# Patient Record
Sex: FEMALE | Race: WHITE | URBAN - METROPOLITAN AREA
[De-identification: names, ages, dates, MRNs, and addresses within clinical notes are randomized per-mention and may not be internally consistent; named-entity substitution may affect disease eponyms.]

---
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
## 2021-01-11 ENCOUNTER — TELEPHONE ENCOUNTER (OUTPATIENT)
Dept: URBAN - METROPOLITAN AREA CLINIC 92 | Facility: CLINIC | Age: 21
End: 2021-01-11

## 2021-01-14 ENCOUNTER — TELEPHONE ENCOUNTER (OUTPATIENT)
Dept: URBAN - METROPOLITAN AREA CLINIC 92 | Facility: CLINIC | Age: 21
End: 2021-01-14

## 2021-01-14 ENCOUNTER — OFFICE VISIT (OUTPATIENT)
Dept: URBAN - METROPOLITAN AREA TELEHEALTH 2 | Facility: TELEHEALTH | Age: 21
End: 2021-01-14
Payer: COMMERCIAL

## 2021-01-14 DIAGNOSIS — R14.0 BLOATING: ICD-10-CM

## 2021-01-14 DIAGNOSIS — K59.01 CONSTIPATION: ICD-10-CM

## 2021-01-14 DIAGNOSIS — R10.12 LUQ ABDOMINAL PAIN: ICD-10-CM

## 2021-01-14 DIAGNOSIS — R11.0 NAUSEA: ICD-10-CM

## 2021-01-14 DIAGNOSIS — D50.8 ANEMIA, DUE TO INADEQUATE IRON INTAKE: ICD-10-CM

## 2021-01-14 DIAGNOSIS — D50.9 IRON DEFICIENCY ANEMIA: ICD-10-CM

## 2021-01-14 PROCEDURE — G8420 CALC BMI NORM PARAMETERS: HCPCS | Performed by: INTERNAL MEDICINE

## 2021-01-14 PROCEDURE — G9903 PT SCRN TBCO ID AS NON USER: HCPCS | Performed by: INTERNAL MEDICINE

## 2021-01-14 PROCEDURE — 99214 OFFICE O/P EST MOD 30 MIN: CPT | Performed by: INTERNAL MEDICINE

## 2021-01-14 PROCEDURE — 1036F TOBACCO NON-USER: CPT | Performed by: INTERNAL MEDICINE

## 2021-01-14 PROCEDURE — G8427 DOCREV CUR MEDS BY ELIG CLIN: HCPCS | Performed by: INTERNAL MEDICINE

## 2021-01-14 PROCEDURE — G8482 FLU IMMUNIZE ORDER/ADMIN: HCPCS | Performed by: INTERNAL MEDICINE

## 2021-01-14 RX ORDER — SODIUM, POTASSIUM,MAG SULFATES 17.5-3.13G
354 ML SOLUTION, RECONSTITUTED, ORAL ORAL
Qty: 354 ML | Refills: 0 | OUTPATIENT
Start: 2021-01-14

## 2021-01-14 RX ORDER — LINACLOTIDE 145 UG/1
1 CAPSULE AT LEAST 30 MINUTES BEFORE THE FIRST MEAL OF THE DAY ON AN EMPTY STOMACH CAPSULE, GELATIN COATED ORAL ONCE A DAY
Qty: 90 | Refills: 3 | Status: ACTIVE | COMMUNITY
Start: 2020-12-30

## 2021-01-14 NOTE — HPI-OTHER HISTORIES
This is a 20-year-old female seen in Dec for GI consult.       She has chronic fatigue associated with night sweats and arthralgia.  She has a hx of reflux and frequent eructation associated with LUQ abdominal pain, bloating, and nausea. The reflux however has improved since cutting out sugar but the nausea which has been present for years has been more intense and constant for months. No vomiting and no agg factors. Zofran and phenergan do not help. She has tried a gluten-free diet without any significant improvement of symptoms.    The patient has chronic constipation with infrequent bowel movement without any overt bleeding including hematemesis, melena, hematochezia.  There is no fever, chills, and denies any unintentional weight loss. She has severe bloating that worsens with po intake. + brain fog.  She tried linzess 145 since last OV without sign improvement--BM 3/week and small and hard  She had a CT of the abdomen and pelvis that demonstrated enlarged spleen and a positive Monospot. She has also been falsely diagnosed twice in the past with mono when presenting w similar sx.  She had labs on 09/12/2020 that showed iron deficiency anemia with iron 61, ferritin 5,% saturation 13. Her Hgb is 12.6 and has a normal TSH of 0.92.  Her autoimmune workup including RAJAN and other autoimmune markers were unremarkable.  Because she has a seizure disorder, she recently had MRI of the brain which was negative.  She has metromenorrhagia and is currently being evaluated by GYN-skipping cycles now. She is pending evaluation at [facility] at the end of the month.   HLA typing extremely low risk celiac CT with stool in colon and bicornuate uterus with heterogenous enhancement ow normal

## 2021-01-15 ENCOUNTER — WEB ENCOUNTER (OUTPATIENT)
Dept: URBAN - METROPOLITAN AREA CLINIC 92 | Facility: CLINIC | Age: 21
End: 2021-01-15

## 2021-01-19 ENCOUNTER — OFFICE VISIT (OUTPATIENT)
Dept: URBAN - METROPOLITAN AREA CLINIC 92 | Facility: CLINIC | Age: 21
End: 2021-01-19

## 2021-03-26 ENCOUNTER — OFFICE VISIT (OUTPATIENT)
Dept: URBAN - METROPOLITAN AREA CLINIC 92 | Facility: CLINIC | Age: 21
End: 2021-03-26
Payer: COMMERCIAL

## 2021-03-26 ENCOUNTER — TELEPHONE ENCOUNTER (OUTPATIENT)
Dept: URBAN - METROPOLITAN AREA CLINIC 92 | Facility: CLINIC | Age: 21
End: 2021-03-26

## 2021-03-26 VITALS
SYSTOLIC BLOOD PRESSURE: 93 MMHG | BODY MASS INDEX: 18.99 KG/M2 | HEART RATE: 70 BPM | WEIGHT: 114 LBS | DIASTOLIC BLOOD PRESSURE: 62 MMHG | TEMPERATURE: 97.8 F | HEIGHT: 65 IN

## 2021-03-26 DIAGNOSIS — R14.0 BLOATING: ICD-10-CM

## 2021-03-26 DIAGNOSIS — R11.0 NAUSEA: ICD-10-CM

## 2021-03-26 DIAGNOSIS — K59.01 CONSTIPATION: ICD-10-CM

## 2021-03-26 DIAGNOSIS — D50.8 ANEMIA, DUE TO INADEQUATE IRON INTAKE: ICD-10-CM

## 2021-03-26 PROCEDURE — 99215 OFFICE O/P EST HI 40 MIN: CPT | Performed by: INTERNAL MEDICINE

## 2021-03-26 RX ORDER — LINACLOTIDE 145 UG/1
1 CAPSULE AT LEAST 30 MINUTES BEFORE THE FIRST MEAL OF THE DAY ON AN EMPTY STOMACH CAPSULE, GELATIN COATED ORAL ONCE A DAY
Qty: 90 | Refills: 3 | Status: ACTIVE | COMMUNITY
Start: 2020-12-30

## 2021-03-26 RX ORDER — SODIUM, POTASSIUM,MAG SULFATES 17.5-3.13G
354 ML SOLUTION, RECONSTITUTED, ORAL ORAL
Qty: 354 ML | Refills: 0 | Status: ON HOLD | COMMUNITY
Start: 2021-01-14

## 2021-03-26 NOTE — HPI-TODAY'S VISIT:
This is a 20-year-old female who presents for follow-up.  Patient was initially seen by Whitney Dutton for heartburn associated with frequent eructation as well as LUQ abdominal pain, bloating and nausea.  She also has chronic constipation with infrequent bowel movement without overt bleeding that did not improve with Linzess.   She had a CT of the abdomen and pelvis that demonstrated enlarged spleen and a positive Monospot  With false-positive test.  She had labs on 09/12/2020 that showed iron deficiency anemia with iron 61, ferritin 5,% saturation 13. Her Hgb is 12.6 and has a normal TSH of 0.92.  Her autoimmune workup including RAJAN and other autoimmune markers were unremarkable.  HLA typing extremely low risk for celiac disease.    In the interim of her visit, she had evaluation at [facility].  she had extensive workup including abdominal ultrasound was negative for acute process.  Lactulose breath test on 02/23/2021 was negative for SIBO.  She and positive black toes intolerant on breath test.  EGD on 02/22/2021 showed reactive gastropathy without H pylori .  She was recommend to undergo anorectal manometry for chronic constipation but declined the test.  Her labs including thyroid function test was unremarkable.  She was diagnosed with fibromyalgia by Neurology.  She continues to have constant nausea associated with fatigue.  She has chronic constipation with infrequent bowel movement and is currently taking Linzess 145 mcg 3 times daily with improvement of constipation but continues to have sense of incomplete voiding.  She has postprandial bloating but denies any unintentional weight loss.

## 2021-04-20 ENCOUNTER — WEB ENCOUNTER (OUTPATIENT)
Dept: URBAN - METROPOLITAN AREA CLINIC 92 | Facility: CLINIC | Age: 21
End: 2021-04-20

## 2021-04-22 ENCOUNTER — OFFICE VISIT (OUTPATIENT)
Dept: URBAN - METROPOLITAN AREA SURGERY CENTER 16 | Facility: SURGERY CENTER | Age: 21
End: 2021-04-22
Payer: COMMERCIAL

## 2021-04-22 DIAGNOSIS — K59.09 CHRONIC CONSTIPATION: ICD-10-CM

## 2021-04-22 PROCEDURE — G8907 PT DOC NO EVENTS ON DISCHARG: HCPCS | Performed by: INTERNAL MEDICINE

## 2021-04-22 PROCEDURE — 45378 DIAGNOSTIC COLONOSCOPY: CPT | Performed by: INTERNAL MEDICINE

## 2021-04-22 RX ORDER — LINACLOTIDE 145 UG/1
1 CAPSULE AT LEAST 30 MINUTES BEFORE THE FIRST MEAL OF THE DAY ON AN EMPTY STOMACH CAPSULE, GELATIN COATED ORAL ONCE A DAY
Qty: 90 | Refills: 3 | Status: ACTIVE | COMMUNITY
Start: 2020-12-30

## 2021-04-22 RX ORDER — SODIUM, POTASSIUM,MAG SULFATES 17.5-3.13G
354 ML SOLUTION, RECONSTITUTED, ORAL ORAL
Qty: 354 ML | Refills: 0 | Status: ON HOLD | COMMUNITY
Start: 2021-01-14

## 2021-04-26 ENCOUNTER — WEB ENCOUNTER (OUTPATIENT)
Dept: URBAN - METROPOLITAN AREA CLINIC 92 | Facility: CLINIC | Age: 21
End: 2021-04-26

## 2021-04-26 RX ORDER — PLECANATIDE 3 MG/1
1 TABLET TABLET ORAL ONCE A DAY
Qty: 90 | Refills: 3 | OUTPATIENT
Start: 2021-04-29 | End: 2022-04-24

## 2021-04-29 PROBLEM — 82934008: Status: ACTIVE | Noted: 2021-04-29

## 2021-05-04 ENCOUNTER — OFFICE VISIT (OUTPATIENT)
Dept: URBAN - METROPOLITAN AREA MEDICAL CENTER 28 | Facility: MEDICAL CENTER | Age: 21
End: 2021-05-04

## 2021-12-14 ENCOUNTER — DASHBOARD ENCOUNTERS (OUTPATIENT)
Age: 21
End: 2021-12-14

## 2021-12-15 ENCOUNTER — OFFICE VISIT (OUTPATIENT)
Dept: URBAN - METROPOLITAN AREA CLINIC 92 | Facility: CLINIC | Age: 21
End: 2021-12-15
Payer: COMMERCIAL

## 2021-12-15 VITALS
HEART RATE: 75 BPM | TEMPERATURE: 98.7 F | SYSTOLIC BLOOD PRESSURE: 105 MMHG | HEIGHT: 65 IN | DIASTOLIC BLOOD PRESSURE: 68 MMHG | BODY MASS INDEX: 18.16 KG/M2 | WEIGHT: 109 LBS

## 2021-12-15 DIAGNOSIS — K59.01 CONSTIPATION: ICD-10-CM

## 2021-12-15 DIAGNOSIS — R11.0 NAUSEA: ICD-10-CM

## 2021-12-15 DIAGNOSIS — R14.0 BLOATING: ICD-10-CM

## 2021-12-15 DIAGNOSIS — D50.9 IRON DEFICIENCY ANEMIA: ICD-10-CM

## 2021-12-15 PROBLEM — 14760008 CONSTIPATION: Status: ACTIVE | Noted: 2020-12-30

## 2021-12-15 PROBLEM — 87522002 IRON DEFICIENCY ANEMIA: Status: ACTIVE | Noted: 2020-12-30

## 2021-12-15 PROCEDURE — 99213 OFFICE O/P EST LOW 20 MIN: CPT | Performed by: PHYSICIAN ASSISTANT

## 2021-12-15 RX ORDER — PLECANATIDE 3 MG/1
1 TABLET TABLET ORAL ONCE A DAY
Qty: 90 | Refills: 3 | Status: ON HOLD | COMMUNITY
Start: 2021-04-29 | End: 2022-04-24

## 2021-12-15 RX ORDER — LINACLOTIDE 145 UG/1
1 CAPSULE AT LEAST 30 MINUTES BEFORE THE FIRST MEAL OF THE DAY ON AN EMPTY STOMACH CAPSULE, GELATIN COATED ORAL ONCE A DAY
Qty: 90 | Refills: 3 | Status: ACTIVE | COMMUNITY
Start: 2020-12-30

## 2021-12-15 RX ORDER — SODIUM, POTASSIUM,MAG SULFATES 17.5-3.13G
354 ML SOLUTION, RECONSTITUTED, ORAL ORAL
Qty: 354 ML | Refills: 0 | Status: ON HOLD | COMMUNITY
Start: 2021-01-14

## 2021-12-15 NOTE — HPI-TODAY'S VISIT:
This is a 21-year-old female who presents for follow-up.     Patient was initially seen 12/2020 for heartburn associated with frequent eructation as well as LUQ abdominal pain, bloating and nausea.  She also noted chronic constipation with infrequent bowel movements without overt bleeding.   She had a CT of the abdomen and pelvis that demonstrated enlarged spleen and a positive Monospot  With false-positive test.  She had labs on 09/12/2020 that showed iron deficiency anemia with iron 61, ferritin 5,% saturation 13. Her Hgb is 12.6 and has a normal TSH of 0.92.  Her autoimmune workup including RAJAN and other autoimmune markers were unremarkable.  HLA typing extremely low risk for celiac disease.  She had a recent evaluation at [facility] for GI and non-GI issues.  She had extensive workup including abdominal ultrasound was negative for acute process.   Breath test on 02/23/2021 was negative for SIBO.  She and positive lactose intolerant on breath test.  EGD on 02/22/2021 showed reactive gastropathy without H pylori .  She was recommend to undergo anorectal manometry for chronic constipation but declined the test.  Her labs including thyroid function test was unremarkable.  She was diagnosed with fibromyalgia by Neurology.   She is dairy free and notes sign improvement in bloating, abd discomfort and nausea but is frustrated by constipation. She is on linzess 145 and has BMs daily but then has days with increased constipation, driven by cycle and other unpredictable things. She is frustrated as stools at times can be hard and can have overlapping bloating w/ episodes of constipation. Miralax increases her bloating. Trulance helped some but was not covered by insurance.  Colonoscopy 4/2021 showed IH ow normal

## 2022-01-05 ENCOUNTER — OFFICE VISIT (OUTPATIENT)
Dept: URBAN - METROPOLITAN AREA CLINIC 92 | Facility: CLINIC | Age: 22
End: 2022-01-05

## 2022-01-23 ENCOUNTER — ERX REFILL RESPONSE (OUTPATIENT)
Dept: URBAN - METROPOLITAN AREA CLINIC 92 | Facility: CLINIC | Age: 22
End: 2022-01-23

## 2022-01-23 RX ORDER — LINACLOTIDE 145 UG/1
TAKE 1 CAPSULE BY MOUTH AT LEAST 30 MINUTES BEFORE THE FIRST MEAL OF THE DAY ON AN EMPTY STOMACH ONE TIME DAILY FOR 90 DAYS CAPSULE, GELATIN COATED ORAL
Qty: 90 CAPSULE | Refills: 1 | OUTPATIENT

## 2022-01-23 RX ORDER — LINACLOTIDE 145 UG/1
1 CAPSULE AT LEAST 30 MINUTES BEFORE THE FIRST MEAL OF THE DAY ON AN EMPTY STOMACH CAPSULE, GELATIN COATED ORAL ONCE A DAY
Qty: 90 | Refills: 3 | OUTPATIENT

## 2024-09-04 NOTE — PHYSICAL EXAM CARDIOVASCULAR:
no edema, no murmurs, regular rate and rhythm Detail Level: Detailed Quality 410: Psoriasis Clinical Response To Oral Systemic Or Biologic Medications: Psoriasis Assessment Tool Documented, Met Specified Benchmark Quality 176: Tuberculosis Screening Prior To First Course Of Biologic And/Or Immune Response Modifier Therapy: Patient receiving first-time biologic and/or immune response modifier therapy, TB Screening Performed and Results Interpreted within 12 months